# Patient Record
Sex: FEMALE | Race: WHITE | Employment: OTHER
[De-identification: names, ages, dates, MRNs, and addresses within clinical notes are randomized per-mention and may not be internally consistent; named-entity substitution may affect disease eponyms.]

---

## 2023-07-21 ENCOUNTER — TRANSCRIBE ORDERS (OUTPATIENT)
Dept: OTHER | Age: 63
End: 2023-07-21

## 2023-07-21 DIAGNOSIS — G35 MULTIPLE SCLEROSIS (H): Primary | ICD-10-CM

## 2023-09-30 ENCOUNTER — HEALTH MAINTENANCE LETTER (OUTPATIENT)
Age: 63
End: 2023-09-30

## 2023-11-12 NOTE — PROGRESS NOTES
[facility] Neurology   MS Clinic Evaluation     Chief Complaint: re-establish care for known MS      11/13/2023   Source of information: Patient and chart review    History of Present Symptom:  Nelida Perry is a 63 year old female with a PMH significant for MS since 2006 (she used to see Dr. Frankel but then was seeing Dr. Wells when Dr. Frankel left) stable on copaxone, as well as a left foot droop and previous L4/5 and L5/1 diskectomy in 2014 and evidence of L5 radiculopathy, who presents today to re-establish care with Dr. Frankel for MS.      Per chart review, she has had MS since 2006, started medications in 2007. She tells me today that she initially saw Dr. Gasca and then another provider at [facility] of neurology and then connected with Dr. Frankel, whom she is very grateful to have met. Nelida has previously been on different formulations, one called Glitopa but is currently on Copaxone and prefers not to be on the generic. She is currently on Copaxone 40 mg injections 3x weekly, and has been doing this regiment for about 5 years ago, prior  she was doing daily injections. Nelida denies any new concerns or complaints today. She is interested to discuss how her MS may react to need for right hip replacement and is very nervous about surgery upcoming.       Disease onset:  2006, at age 44, with sx of optic neuritis symptoms (unsure what side, from notes appears R), as well as Left leg numbness/burning sensations but complicated by lumbar radiculopathy.     Most recent relapse: None. 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  Copaxone 20 mg 0550-5482, lipoatrohy  Copaxone 40 mg 2019-present      Symptom management: No particular concerns. Left foot drop related to lumbar and peroneal concerns since 2014. She does have some other  "medical conditions going on. She has to have right hip replacement coming up and is concerned that this surgery could cause worsening MS or cause a flare.     Fatigue: Denies  Mood: Feels \"fine\" but the pain is 'weary\" and she tries to be cautious with use of medications. She feels the pain does effect her ability to do things that would improve her mood  Sleep: 4-5 hours a night, she is \"awake a lot\" not particularly due to pain   Bowel/bladder changes: Denies frequency, urgency or urine. Daily bowel movements. No incontinence   Gait/balance: Her right hip causes pain her to limp as well as her left foot drop and her back causes her pain sometimes. Her back will flare with more activity.     She is now retired as of last  2 years, she was a  for the [employer].  She has 2 children, one locally. She has a sister she is very close with.       Interestingly she does tell me that 2 x when she injected the copaxone near her bellybutton she had a 20 minute reaction of flushing and tachycardia with shaking, but this has never happened again if she avoids that area.     The patient's medical, surgical, social, and family history were personally reviewed with the patient.  No past medical history on file.   No past surgical history on file.  Social History     Tobacco Use    Smoking status: Never    Smokeless tobacco: Never     No family history on file.  Current Outpatient Medications   Medication    atorvastatin (LIPITOR) 20 MG tablet    cetirizine (ZYRTEC) 10 MG tablet    cholecalciferol (VITAMIN D3) 125 mcg (5000 units) capsule    COPAXONE 40 MG/ML injection    hydrochlorothiazide (HYDRODIURIL) 25 MG tablet    levothyroxine (SYNTHROID/LEVOTHROID) 112 MCG tablet    Multiple Vitamins-Minerals (CENTRUM-LUTEIN OR)    misoprostol (CYTOTEC) 200 MCG tablet     No current facility-administered medications for this visit.     No Known Allergies    Sister with sjrogrens, no fhx of MS. Brother with diabetes " (T2DM)    Physical Examination   Vitals: BP (!) 141/84 (BP Location: Right arm, Patient Position: Sitting)   Pulse 73    General: Patient appears comfortable in no acute distress. Pleasant and talkative, energetic and engaged  HEENT: NC/AT, no icterus, moist mucous membranes  Chest: non-labored on RA  Extremities: Warm, no edema  Skin: No rash or lesion   Psych: Affect appropriate for situation   Neuro:Mental status: Awake, alert, attentive. Language is fluent with intact comprehension.   Cranial nerves: pupils equal, conjugate gaze, EOMI, face symmetric, shoulder shrug strong, tongue protrusion midline, no dysarthria.   Motor: Normal muscle bulk and tone. No abnormal movements.   She is wearing an ankle brace on left leg and b/l danyel hose       R L  Deltoid  5 5  Biceps  5 5  Triceps  5 5  Wrist Flex  5 5  Wrist Ext 5 5  Finger Flex 5 5  Finger Ext 5 5    Hip flexion 4 (limited by pain)  5-  Knee flexion 5 5  Knee ext 5 5  Dorsiflexion 5 4-  PlantarFlexion 5         5  Inversion         5          3  Eversion          5         4-    Reflexes: brisk reflexes symmetric in biceps, brachioradialis, +3 reflexes at patellae  Sensory: Intact to light touch but reportedly reduced on lateral aspect of left leg Romberg is negative.   Coordination: FNF without ataxia or dysmetria.    Gait: Normal width, stride length minimally reduced, limps with pain in right hip and left foot drags subtley  Able to rise from chair with arms crossed. Did not test ability to to rise from chair with each leg individually,  Able to balance on each leg for > 1 seconds individually, harder on the left leg.     Laboratory:  MRI Brain - personally reviewed with Dr. Frankel  7/2022  1.  No change.   2.  Mild burden of supratentorial white matter lesions correlating with the history of multiple sclerosis.   3.  Mild burden of T1 hypointense lesions.   4.  No enhancing lesion.     MRI Cervical spine   7/2022  1.  No change.   2.  Mild burden of white  matter lesions in the cord at C3, C5 and C7-T1.   3.  No enhancing lesion.   4.  Severe right C5-C6 neural foraminal stenosis due to chronic degenerative change.     MRI Thoracic spine   7/2022  1.  No change.   2.  Subtle lesions in the cord at C7-T1 and T8.   3.  No enhancing lesion.   4.  No high-grade spinal canal or neural foraminal stenosis.   5.  Incidental left paraspinal cyst.     MRI lumbar spine 12/2022 -   IMPRESSION:   1. At L4-L5, a broad-based central disc protrusion contributes to moderate right lateral recess stenosis and impinges upon the descending right L5 nerve root. There is mild-to-moderate right neural foraminal stenosis.   2.  At L5-S1, a broad-based disc herniation contributes to severe right lateral recess stenosis with impingement upon the descending right S1 nerve root. This also abuts the descending left S1 nerve root. There is moderate left neural foraminal stenosis.   3.  Additional degenerative changes as above without high-grade spinal canal stenosis or other sites of high-grade neural foraminal stenosis.       MRI brain   Stable from 2006, 2009, [id number]  2016 - personally reviewed with Dr. Frankel-low lesion burden, periventricular, no posterior fossa lesions, no contrast enhancing lesions  4/2019 - no new lesions, no contrast enhancing lesions  7/2022- no new lesions, personally reviewed with Dr. Frankel    MRI cervical and thoracic spine  2016 - questionable lesions at c3, t1, t6, and t10. Report indicates stable but image quality is difficult to tell.    MRI C SPINE only:   4/2019 - concern for new lesion L lat C5, contrast negative  7/2022 - no new lesions    MRI T spine:   7/2022 - no new lesions        Assessment/Plan:  Nelida Perry is a 63 year old female with MS since 2006 (she used to see Dr. Frankel but then was seeing Dr. Wells when Dr. Frankel left) stable on Copaxone, but additionally has persistent left foot droop likely 2/2 L5 radiculopathy with contributions from MS  possible, who presents today to re-establish care with Dr. Frankel for MS.  Nelida has had no flares or relapses since she was last evaluated. She has had no significant difficulty with her medications for MS. She has had stable imaging, last completed over 1 year ago. Today we did discuss the possibility of her coming off her MS medications  considering her age and stability, (since she is now stable for over 10 years, and is almost greater than 64 in age),  but recommended waiting until completion of right hip surgery and we can re discuss at next visit. If we plan to disconintue the medication we will need repeat baseline full MRI, and then likely get MRI imaging monitoring at 6 months, and then 1 year from that time point to monitor, if discontinuation is something she is interested in. Additionally, there is no rush to discontinue the medication, especially as Copaxone does not impair immune function. We discussed that surgery can worsen her symptoms but unlikely that surgery would cause any new lesions from MS. We also discussed the importance of PT for recovery and that her recovery may be prolonged compared to someone who does not have MS, and that she may need a specialized neurorehab after surgery.     -Follow up 2-3 months after right hip surgery  -Highly encouraged PT after surgery and continued daily movement and exercise  -Refilled copaxone 40 mg injection to be taken 3x weekly          Patient seen and discussed with Dr. Frankel.  Michelle Holt DO, CALOS  Neurology Resident, PGY-4

## 2023-11-13 ENCOUNTER — OFFICE VISIT (OUTPATIENT)
Dept: NEUROLOGY | Facility: CLINIC | Age: 63
End: 2023-11-13
Payer: COMMERCIAL

## 2023-11-13 VITALS — SYSTOLIC BLOOD PRESSURE: 141 MMHG | DIASTOLIC BLOOD PRESSURE: 84 MMHG | HEART RATE: 73 BPM

## 2023-11-13 DIAGNOSIS — G35 MULTIPLE SCLEROSIS (H): Primary | ICD-10-CM

## 2023-11-13 DIAGNOSIS — R26.81 GAIT INSTABILITY: ICD-10-CM

## 2023-11-13 DIAGNOSIS — M16.11 PRIMARY OSTEOARTHRITIS OF RIGHT HIP: ICD-10-CM

## 2023-11-13 DIAGNOSIS — M21.372 LEFT FOOT DROP: ICD-10-CM

## 2023-11-13 PROCEDURE — 99205 OFFICE O/P NEW HI 60 MIN: CPT | Mod: GC | Performed by: PSYCHIATRY & NEUROLOGY

## 2023-11-13 RX ORDER — LEVOTHYROXINE SODIUM 112 UG/1
1 TABLET ORAL DAILY
COMMUNITY
Start: 2023-10-24 | End: 2024-10-23

## 2023-11-13 RX ORDER — GLATIRAMER ACETATE 40 MG/ML
INJECTION, SOLUTION SUBCUTANEOUS
COMMUNITY
Start: 2023-07-05 | End: 2023-11-13

## 2023-11-13 RX ORDER — HYDROCHLOROTHIAZIDE 25 MG/1
25 TABLET ORAL PRN
COMMUNITY
Start: 2023-07-20 | End: 2024-07-19

## 2023-11-13 RX ORDER — MISOPROSTOL 200 UG/1
TABLET ORAL
COMMUNITY
Start: 2023-08-28

## 2023-11-13 RX ORDER — GLATIRAMER ACETATE 40 MG/ML
40 INJECTION, SOLUTION SUBCUTANEOUS
Qty: 12 ML | Refills: 11 | Status: SHIPPED | OUTPATIENT
Start: 2023-11-13

## 2023-11-13 RX ORDER — CETIRIZINE HYDROCHLORIDE 10 MG/1
10 TABLET ORAL DAILY
COMMUNITY

## 2023-11-13 RX ORDER — ATORVASTATIN CALCIUM 20 MG/1
20 TABLET, FILM COATED ORAL DAILY
COMMUNITY
Start: 2023-10-24 | End: 2024-10-23

## 2023-11-13 NOTE — NURSING NOTE
Chief Complaint   Patient presents with    MS     Referred by NOHEMY Delvalle on 11/13/2023 at 8:50 AM

## 2023-11-13 NOTE — LETTER
11/13/2023         RE: Nelida Perry  [address]  [place] [address]        Dear Colleague,    Thank you for referring your patient, Nelida Perry, to the [facility] NEUROLOGY [facility]IC [facility]. Please see a copy of my visit note below.      [facility] Neurology   MS Clinic Evaluation     Chief Complaint: re-establish care for known MS      11/13/2023   Source of information: Patient and chart review    History of Present Symptom:  Nelida Perry is a 63 year old female with a PMH significant for MS since 2006 (she used to see Dr. Frankel but then was seeing Dr. Wells when Dr. Frankel left) stable on copaxone, as well as a left foot droop and previous L4/5 and L5/1 diskectomy in 2014 and evidence of L5 radiculopathy, who presents today to re-establish care with Dr. Frankel for MS.      Per chart review, she has had MS since 2006, started medications in 2007. She tells me today that she initially saw Dr. Gasca and then another provider at [facility] of neurology and then connected with Dr. Frankel, whom she is very grateful to have met. Nelida has previously been on different formulations, one called Glitopa but is currently on Copaxone and prefers not to be on the generic. She is currently on Copaxone 40 mg injections 3x weekly, and has been doing this regiment for about 5 years ago, prior  she was doing daily injections. Nelida denies any new concerns or complaints today. She is interested to discuss how her MS may react to need for right hip replacement and is very nervous about surgery upcoming.       Disease onset:  2006, at age 44, with sx of optic neuritis symptoms (unsure what side, from notes appears R), as well as Left leg numbness/burning sensations but complicated by lumbar radiculopathy.     Most recent relapse: None. 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  Copaxone 20 mg [id number], lipoatrohy  Copaxone 40 mg 2019-present      Symptom management: No particular concerns. Left foot drop related to lumbar and peroneal concerns since 2014. She does have some other medical conditions going on. She has to have right hip replacement coming up and is concerned that this surgery could cause worsening MS or cause a flare.     Fatigue: Denies  Mood: Feels \"fine\" but the pain is 'weary\" and she tries to be cautious with use of medications. She feels the pain does effect her ability to do things that would improve her mood  Sleep: 4-5 hours a night, she is \"awake a lot\" not particularly due to pain   Bowel/bladder changes: Denies frequency, urgency or urine. Daily bowel movements. No incontinence   Gait/balance: Her right hip causes pain her to limp as well as her left foot drop and her back causes her pain sometimes. Her back will flare with more activity.     She is now retired as of last  2 years, she was a  for the [employer].  She has 2 children, one locally. She has a sister she is very close with.       Interestingly she does tell me that 2 x when she injected the copaxone near her bellybutton she had a 20 minute reaction of flushing and tachycardia with shaking, but this has never happened again if she avoids that area.     The patient's medical, surgical, social, and family history were personally reviewed with the patient.  No past medical history on file.   No past surgical history on file.  Social History     Tobacco Use     Smoking status: Never     Smokeless tobacco: Never     No family history on file.  Current Outpatient Medications   Medication     atorvastatin (LIPITOR) 20 MG tablet     cetirizine (ZYRTEC) 10 MG tablet     cholecalciferol (VITAMIN D3) 125 mcg (5000 units) capsule     COPAXONE 40 MG/ML injection     hydrochlorothiazide (HYDRODIURIL) 25 MG tablet     levothyroxine " (SYNTHROID/LEVOTHROID) 112 MCG tablet     Multiple Vitamins-Minerals (CENTRUM-LUTEIN OR)     misoprostol (CYTOTEC) 200 MCG tablet     No current facility-administered medications for this visit.     No Known Allergies    Sister with sjrogrens, no fhx of MS. Brother with diabetes (T2DM)    Physical Examination   Vitals: BP (!) 141/84 (BP Location: Right arm, Patient Position: Sitting)   Pulse 73    General: Patient appears comfortable in no acute distress. Pleasant and talkative, energetic and engaged  HEENT: NC/AT, no icterus, moist mucous membranes  Chest: non-labored on RA  Extremities: Warm, no edema  Skin: No rash or lesion   Psych: Affect appropriate for situation   Neuro:Mental status: Awake, alert, attentive. Language is fluent with intact comprehension.   Cranial nerves: pupils equal, conjugate gaze, EOMI, face symmetric, shoulder shrug strong, tongue protrusion midline, no dysarthria.   Motor: Normal muscle bulk and tone. No abnormal movements.   She is wearing an ankle brace on left leg and b/l danyel hose       R L  Deltoid  5 5  Biceps  5 5  Triceps  5 5  Wrist Flex  5 5  Wrist Ext 5 5  Finger Flex 5 5  Finger Ext 5 5    Hip flexion 4 (limited by pain)  5-  Knee flexion 5 5  Knee ext 5 5  Dorsiflexion 5 4-  PlantarFlexion 5         5  Inversion         5          3  Eversion          5         4-    Reflexes: brisk reflexes symmetric in biceps, brachioradialis, +3 reflexes at patellae  Sensory: Intact to light touch but reportedly reduced on lateral aspect of left leg Romberg is negative.   Coordination: FNF without ataxia or dysmetria.    Gait: Normal width, stride length minimally reduced, limps with pain in right hip and left foot drags subtley  Able to rise from chair with arms crossed. Did not test ability to to rise from chair with each leg individually,  Able to balance on each leg for > 1 seconds individually, harder on the left leg.     Laboratory:  MRI Brain - personally reviewed with   Jostin  7/2022  1.  No change.   2.  Mild burden of supratentorial white matter lesions correlating with the history of multiple sclerosis.   3.  Mild burden of T1 hypointense lesions.   4.  No enhancing lesion.     MRI Cervical spine   7/2022  1.  No change.   2.  Mild burden of white matter lesions in the cord at C3, C5 and C7-T1.   3.  No enhancing lesion.   4.  Severe right C5-C6 neural foraminal stenosis due to chronic degenerative change.     MRI Thoracic spine   7/2022  1.  No change.   2.  Subtle lesions in the cord at C7-T1 and T8.   3.  No enhancing lesion.   4.  No high-grade spinal canal or neural foraminal stenosis.   5.  Incidental left paraspinal cyst.     MRI lumbar spine 12/2022 -   IMPRESSION:   1. At L4-L5, a broad-based central disc protrusion contributes to moderate right lateral recess stenosis and impinges upon the descending right L5 nerve root. There is mild-to-moderate right neural foraminal stenosis.   2.  At L5-S1, a broad-based disc herniation contributes to severe right lateral recess stenosis with impingement upon the descending right S1 nerve root. This also abuts the descending left S1 nerve root. There is moderate left neural foraminal stenosis.   3.  Additional degenerative changes as above without high-grade spinal canal stenosis or other sites of high-grade neural foraminal stenosis.       MRI brain   Stable from 2006, 2009, [id number]  2016 - personally reviewed with Dr. Frankel-low lesion burden, periventricular, no posterior fossa lesions, no contrast enhancing lesions  4/2019 - no new lesions, no contrast enhancing lesions  7/2022- no new lesions, personally reviewed with Dr. Frankel    MRI cervical and thoracic spine  2016 - questionable lesions at c3, t1, t6, and t10. Report indicates stable but image quality is difficult to tell.    MRI C SPINE only:   4/2019 - concern for new lesion L lat C5, contrast negative  7/2022 - no new lesions    MRI T spine:   7/2022 - no new  lesions        Assessment/Plan:  Nelida Perry is a 63 year old female with MS since 2006 (she used to see Dr. Frankel but then was seeing Dr. Wells when Dr. Frankel left) stable on Copaxone, but additionally has persistent left foot droop likely 2/2 L5 radiculopathy with contributions from MS possible, who presents today to re-establish care with Dr. Frankel for MS.  Nelida has had no flares or relapses since she was last evaluated. She has had no significant difficulty with her medications for MS. She has had stable imaging, last completed over 1 year ago. Today we did discuss the possibility of her coming off her MS medications  considering her age and stability, (since she is now stable for over 10 years, and is almost greater than 64 in age),  but recommended waiting until completion of right hip surgery and we can re discuss at next visit. If we plan to disconintue the medication we will need repeat baseline full MRI, and then likely get MRI imaging monitoring at 6 months, and then 1 year from that time point to monitor, if discontinuation is something she is interested in. Additionally, there is no rush to discontinue the medication, especially as Copaxone does not impair immune function. We discussed that surgery can worsen her symptoms but unlikely that surgery would cause any new lesions from MS. We also discussed the importance of PT for recovery and that her recovery may be prolonged compared to someone who does not have MS, and that she may need a specialized neurorehab after surgery.     -Follow up 2-3 months after right hip surgery  -Highly encouraged PT after surgery and continued daily movement and exercise  -Refilled copaxone 40 mg injection to be taken 3x weekly          Patient seen and discussed with Dr. Frankel.  Michelle Holt DO, CALOS  Neurology Resident, PGY-4                   Attestation signed by Tracy Frankel MD at 11/14/2023  9:06 AM:  I personally saw and evaluated Mrs. Perry  with Dr. Holt on the date of service.  I have reviewed the above documentation and agree with the findings and recommendations.     Gait currently limited by hip pain   Counseled patient on benefit of proceeding with hip replacement - pain resolution will allow her to get the exercise needed to maintain gait with ms   Reaching point where she could likely come off treatment, though would advise continuation until 6-12 months post operative at minimum   Risks of continuing treatment are low, so could consider continuing     I would like to see her after surgery, or next summer    A total of 60 minutes were personally spent in the care of this patient on the date of service.     Tracy Frankel MD on 11/14/2023 at 9:03 AM      Disease onset: age 44, L leg numbness/burning, though ?lumbar radiculopathy   Age 46, R ON     DMD hx:   Copaxone 20 mg [id number], lipoatrohy  Copaxone 40 mg 2019-present    D-50    MRI brain   2016 - personally reviewed, overall low lesion burden, periventricular, no posterior fossa lesions, gd-   Stable from 2006, 2009, [id number] 4/2019 - no new lesions, gd-  7/2022- no new lesions    MRI cervical and thoracic spine  2016 - personally reviewed, possible lesions at c3, t1, t6, t10, reported stable but image quality is poor     MRI cervical spine   4/2019 - possible new lesion L lat C5, gd-, marked difference in imaging quality  7/2022 - no new lesions    MRI thoracic spine   4/2019 - no new lesions, gd-   7/2022 - no new lesions    MRI lumbar spine 12/2022 -   IMPRESSION:   1. At L4-L5, a broad-based central disc protrusion contributes to moderate right lateral recess stenosis and impinges upon the descending right L5 nerve root. There is mild-to-moderate right neural foraminal stenosis.   2.  At L5-S1, a broad-based disc herniation contributes to severe right lateral recess stenosis with impingement upon the descending right S1 nerve root. This also abuts the descending left S1  nerve root. There is moderate left neural foraminal stenosis.   3.  Additional degenerative changes as above without high-grade spinal canal stenosis or other sites of high-grade neural foraminal stenosis.       Again, thank you for allowing me to participate in the care of your patient.        Sincerely,        Tracy Frankel MD

## 2023-11-13 NOTE — PATIENT INSTRUCTIONS
Continue copaxone    Call orthopedics to see if they will require you to do physical therapy - this will allow you to get started now     Follow up with me next summer -- ideal is for me to see you after hip replacement surgery

## 2023-11-13 NOTE — PROGRESS NOTES
Disease onset: age 44, L leg numbness/burning, though ?lumbar radiculopathy   Age 46, R ON     DMD hx:   Copaxone 20 mg 9572-7031, lipoatrohy  Copaxone 40 mg 2019-present    D-50    MRI brain   2016 - personally reviewed, overall low lesion burden, periventricular, no posterior fossa lesions, gd-   Stable from 2006, 2009, [id number] 4/2019 - no new lesions, gd-  7/2022- no new lesions    MRI cervical and thoracic spine  2016 - personally reviewed, possible lesions at c3, t1, t6, t10, reported stable but image quality is poor     MRI cervical spine   4/2019 - possible new lesion L lat C5, gd-, marked difference in imaging quality  7/2022 - no new lesions    MRI thoracic spine   4/2019 - no new lesions, gd-   7/2022 - no new lesions    MRI lumbar spine 12/2022 -   IMPRESSION:   1. At L4-L5, a broad-based central disc protrusion contributes to moderate right lateral recess stenosis and impinges upon the descending right L5 nerve root. There is mild-to-moderate right neural foraminal stenosis.   2.  At L5-S1, a broad-based disc herniation contributes to severe right lateral recess stenosis with impingement upon the descending right S1 nerve root. This also abuts the descending left S1 nerve root. There is moderate left neural foraminal stenosis.   3.  Additional degenerative changes as above without high-grade spinal canal stenosis or other sites of high-grade neural foraminal stenosis.

## 2024-04-27 ENCOUNTER — HEALTH MAINTENANCE LETTER (OUTPATIENT)
Age: 64
End: 2024-04-27

## 2024-06-17 ENCOUNTER — TELEPHONE (OUTPATIENT)
Dept: NEUROLOGY | Facility: CLINIC | Age: 64
End: 2024-06-17
Payer: COMMERCIAL

## 2024-06-17 NOTE — TELEPHONE ENCOUNTER
PA Initiation    Medication: COPAXONE 40 MG/ML SC SOSY  Insurance Company: [employer] - Phone [phone] Fax [phone]  Pharm[place] [facility] [place] [address]  Filling Pharmacy Phone:    Filling Pharmacy Fax:    Start Date: 6/17/2024    [id number]

## 2024-06-18 NOTE — TELEPHONE ENCOUNTER
Prior Authorization Approval    Medication: COPAXONE 40 MG/ML SC SOSY  Authorization Effective Date: 6/17/2024  Authorization Expiration Date: 6/17/2025  Approved Dose/Quantity: 12ml per 28 days  Reference #: [id number]   Insurance Company: [facility] [employer] - Phone [phone] Fax [phone]  Expected CoPay: $    CoPay Card Available:      Financial Assistance Needed: Unknown  [facility] filling the [facility]  Pharmacy Notified: Not needed  Patient Notified: Not needed

## 2024-10-23 DIAGNOSIS — G35 MULTIPLE SCLEROSIS (H): ICD-10-CM

## 2024-10-24 RX ORDER — GLATIRAMER ACETATE 40 MG/ML
40 INJECTION, SOLUTION SUBCUTANEOUS
Qty: 12 ML | Refills: 11 | Status: SHIPPED | OUTPATIENT
Start: 2024-10-25

## 2024-10-31 ENCOUNTER — OFFICE VISIT (OUTPATIENT)
Dept: NEUROLOGY | Facility: CLINIC | Age: 64
End: 2024-10-31
Payer: COMMERCIAL

## 2024-10-31 VITALS — SYSTOLIC BLOOD PRESSURE: 165 MMHG | HEART RATE: 67 BPM | DIASTOLIC BLOOD PRESSURE: 74 MMHG

## 2024-10-31 DIAGNOSIS — M21.372 LEFT FOOT DROP: ICD-10-CM

## 2024-10-31 DIAGNOSIS — G35 MULTIPLE SCLEROSIS (H): Primary | ICD-10-CM

## 2024-10-31 PROCEDURE — 99214 OFFICE O/P EST MOD 30 MIN: CPT | Performed by: PSYCHIATRY & NEUROLOGY

## 2024-10-31 NOTE — NURSING NOTE
Chief Complaint   Patient presents with    Follow Up     NOHEMY Corona on 10/31/2024 at 9:18 AM

## 2024-10-31 NOTE — PATIENT INSTRUCTIONS
Your exam is stable     Continue copaxone   Though consider discontinuation if mri is stable    Mri in 1 year     Follow up after 1 year

## 2024-10-31 NOTE — PROGRESS NOTES
Date of Service: 10/31/2024    [facility] Neurology   MS Clinic Evaluation    Subjective: 64-year-old woman who presents in follow-up for multiple sclerosis.    She does not report any discrete new symptoms related to multiple sclerosis.  She is able to go on walks up to 3 miles without difficulty.  She does not notice any motor fatigue during her walks.  She has not had any falls in the past year.    She did undergo a right total hip arthroplasty in February.  Over the course of 1 month she was able to improve her walking to the point that she can walk 1 mile.    She continues to do Copaxone.  She is tolerating this well.  No more episodes of heart racing.    Disease onset: age 44, L leg numbness/burning, though ?lumbar radiculopathy   Age 46, R ON      DMD hx:   Copaxone 20 mg [id number], lipoatrohy  Copaxone 40 mg 2019-present       No Known Allergies    Current Outpatient Medications   Medication Sig Dispense Refill    cetirizine (ZYRTEC) 10 MG tablet Take 10 mg by mouth daily      cholecalciferol (VITAMIN D3) 125 mcg (5000 units) capsule Take 1 capsule by mouth daily      COPAXONE 40 MG/ML injection Inject 40 mg subcutaneously three times a week. INJECT ONE SYRINGE SUBCUTANEOUSLY THREE TIMES PER WEEK AT LEAST 48 HOURS APART. ALLOW SYRINGE TO WARM TO ROOM TEMPERATURE FOR 20 MINUTES. REFRIGERATE. 12 mL 11    Multiple Vitamins-Minerals (CENTRUM-LUTEIN OR) Take 1 tablet by mouth daily      atorvastatin (LIPITOR) 20 MG tablet Take 20 mg by mouth daily      hydrochlorothiazide (HYDRODIURIL) 25 MG tablet Take 25 mg by mouth as needed      levothyroxine (SYNTHROID/LEVOTHROID) 112 MCG tablet Take 1 tablet by mouth daily      misoprostol (CYTOTEC) 200 MCG tablet Place 3 tablets in vagina the evening prior to surgery- Not taking till end of November 30th, 2023 (Patient not taking: Reported on 11/13/2023)       No current facility-administered medications for this visit.        Past medical, surgical, social and family  history was personally reviewed. Pertinent details noted above.     Physical Examination:   BP (!) 165/74 (BP Location: Right arm, Patient Position: Sitting, Cuff Size: Adult Regular)   Pulse 67     General: no acute distress  Cranial nerves:   VFFC  PERRL w/no RAPD  EOM full w/no TRELL   Face symmetric  Hearing intact  No dysarthria   Motor:   Tone is normal   Bulk is normal     R L  Deltoid  5 5  Biceps  5 5  Triceps 5 5  Wrist ext 5 5  Finger ext 5 5  Finger abd 5 5    Hip flexion 5 5  Knee flexion 5 5  Knee ext 5 5  Ankle d/f 5 4    Reflexes: 2+ and symmetric throughout, no ankle clonus  Sensory: vibration is mildly reduced in the ankles  Romberg is absent  Coordination: no ataxia or dysmetria  Gait: normal base and stride, tandem gait is intact, able to balance on one foot and hop x 5 bilaterally    Tests/Imaging:   D-50     MRI brain   2016 - personally reviewed, overall low lesion burden, periventricular, no posterior fossa lesions, gd-   Stable from 2006, 2009, [id number] 4/2019 - no new lesions, gd-  7/2022- no new lesions    MRI cervical and thoracic spine  2016 - personally reviewed, possible lesions at c3, t1, t6, t10, reported stable but image quality is poor     MRI cervical spine   4/2019 - possible new lesion L lat C5, gd-, marked difference in imaging quality  7/2022 - no new lesions    MRI thoracic spine   4/2019 - no new lesions, gd-   7/2022 - no new lesions     MRI lumbar spine 12/2022 -   IMPRESSION:   1. At L4-L5, a broad-based central disc protrusion contributes to moderate right lateral recess stenosis and impinges upon the descending right L5 nerve root. There is mild-to-moderate right neural foraminal stenosis.   2.  At L5-S1, a broad-based disc herniation contributes to severe right lateral recess stenosis with impingement upon the descending right S1 nerve root. This also abuts the descending left S1 nerve root. There is moderate left neural foraminal stenosis.   3.  Additional  degenerative changes as above without high-grade spinal canal stenosis or other sites of high-grade neural foraminal stenosis.     Assessment: 64-year-old woman with relapsing remitting multiple sclerosis who appears to be clinically stable on Copaxone.  She is due for radiologic surveillance.  I have advised she undergo this in 1 year.  If this is stable we could consider discontinuation of treatment.  She was a bit hesitant to consider this, though we will discuss at her next appointment.    Plan:   -Continue Copaxone  - MRI in 1 year  - Follow-up in 1 year    Note was completed with the assistance of Dragon Fluency software which can often result in accidental word substitutions.     A total of 30 minutes on the date of service were spent in the care of this patient.   Tracy Frankel MD on 10/31/2024 at 9:24 AM

## 2024-10-31 NOTE — LETTER
10/31/2024      Nelida Perry  [address]  [place] [address]      Dear Colleague,    Thank you for referring your patient, Nelida Perry, to the [facility] NEUROLOGY CLINIC [facility]. Please see a copy of my visit note below.    Date of Service: 10/31/2024    [facility] Neurology   MS Clinic Evaluation    Subjective: 64-year-old woman who presents in follow-up for multiple sclerosis.    She does not report any discrete new symptoms related to multiple sclerosis.  She is able to go on walks up to 3 miles without difficulty.  She does not notice any motor fatigue during her walks.  She has not had any falls in the past year.    She did undergo a right total hip arthroplasty in February.  Over the course of 1 month she was able to improve her walking to the point that she can walk 1 mile.    She continues to do Copaxone.  She is tolerating this well.  No more episodes of heart racing.    Disease onset: age 44, L leg numbness/burning, though ?lumbar radiculopathy   Age 46, R ON      DMD hx:   Copaxone 20 mg [id number], lipoatrohy  Copaxone 40 mg 2019-present       No Known Allergies    Current Outpatient Medications   Medication Sig Dispense Refill     cetirizine (ZYRTEC) 10 MG tablet Take 10 mg by mouth daily       cholecalciferol (VITAMIN D3) 125 mcg (5000 units) capsule Take 1 capsule by mouth daily       COPAXONE 40 MG/ML injection Inject 40 mg subcutaneously three times a week. INJECT ONE SYRINGE SUBCUTANEOUSLY THREE TIMES PER WEEK AT LEAST 48 HOURS APART. ALLOW SYRINGE TO WARM TO ROOM TEMPERATURE FOR 20 MINUTES. REFRIGERATE. 12 mL 11     Multiple Vitamins-Minerals (CENTRUM-LUTEIN OR) Take 1 tablet by mouth daily       atorvastatin (LIPITOR) 20 MG tablet Take 20 mg by mouth daily       hydrochlorothiazide (HYDRODIURIL) 25 MG tablet Take 25 mg by mouth as needed       levothyroxine (SYNTHROID/LEVOTHROID) 112 MCG tablet Take 1 tablet by mouth daily       misoprostol (CYTOTEC) 200 MCG tablet Place 3  tablets in vagina the evening prior to surgery- Not taking till end of November 30th, 2023 (Patient not taking: Reported on 11/13/2023)       No current facility-administered medications for this visit.        Past medical, surgical, social and family history was personally reviewed. Pertinent details noted above.     Physical Examination:   BP (!) 165/74 (BP Location: Right arm, Patient Position: Sitting, Cuff Size: Adult Regular)   Pulse 67     General: no acute distress  Cranial nerves:   VFFC  PERRL w/no RAPD  EOM full w/no TRELL   Face symmetric  Hearing intact  No dysarthria   Motor:   Tone is normal   Bulk is normal     R L  Deltoid  5 5  Biceps  5 5  Triceps 5 5  Wrist ext 5 5  Finger ext 5 5  Finger abd 5 5    Hip flexion 5 5  Knee flexion 5 5  Knee ext 5 5  Ankle d/f 5 4    Reflexes: 2+ and symmetric throughout, no ankle clonus  Sensory: vibration is mildly reduced in the ankles  Romberg is absent  Coordination: no ataxia or dysmetria  Gait: normal base and stride, tandem gait is intact, able to balance on one foot and hop x 5 bilaterally    Tests/Imaging:   D-50     MRI brain   2016 - personally reviewed, overall low lesion burden, periventricular, no posterior fossa lesions, gd-   Stable from 2006, 2009, [id number] 4/2019 - no new lesions, gd-  7/2022- no new lesions    MRI cervical and thoracic spine  2016 - personally reviewed, possible lesions at c3, t1, t6, t10, reported stable but image quality is poor     MRI cervical spine   4/2019 - possible new lesion L lat C5, gd-, marked difference in imaging quality  7/2022 - no new lesions    MRI thoracic spine   4/2019 - no new lesions, gd-   7/2022 - no new lesions     MRI lumbar spine 12/2022 -   IMPRESSION:   1. At L4-L5, a broad-based central disc protrusion contributes to moderate right lateral recess stenosis and impinges upon the descending right L5 nerve root. There is mild-to-moderate right neural foraminal stenosis.   2.  At L5-S1, a broad-based  disc herniation contributes to severe right lateral recess stenosis with impingement upon the descending right S1 nerve root. This also abuts the descending left S1 nerve root. There is moderate left neural foraminal stenosis.   3.  Additional degenerative changes as above without high-grade spinal canal stenosis or other sites of high-grade neural foraminal stenosis.     Assessment: 64-year-old woman with relapsing remitting multiple sclerosis who appears to be clinically stable on Copaxone.  She is due for radiologic surveillance.  I have advised she undergo this in 1 year.  If this is stable we could consider discontinuation of treatment.  She was a bit hesitant to consider this, though we will discuss at her next appointment.    Plan:   -Continue Copaxone  - MRI in 1 year  - Follow-up in 1 year    Note was completed with the assistance of Dragon Fluency software which can often result in accidental word substitutions.     A total of 30 minutes on the date of service were spent in the care of this patient.   Tracy Frankel MD on 10/31/2024 at 9:24 AM        Again, thank you for allowing me to participate in the care of your patient.        Sincerely,        Tracy Frankel MD

## 2025-01-22 NOTE — TELEPHONE ENCOUNTER
Dr Cerda otified pt 1 mg BID stored in pharmacy but pt reports has been taken TId for two weeks to amonths time.   Pending Prescriptions:                       Disp   Refills    COPAXONE 40 MG/ML injection               12 mL  11           Sig: Inject 40 mg subcutaneously three times a week.           INJECT ONE SYRINGE SUBCUTANEOUSLY THREE TIMES PER           WEEK AT LEAST 48 HOURS APART. ALLOW SYRINGE TO           WARM TO ROOM TEMPERATURE FOR 20 MINUTES.           REFRIGERATE.    Future Appointments    Encounter Information   Provider Department Center   10/31/2024 9:30 AM (Arrive by 9:15 AM) Tracy Frankel [facility]       NOHEMY Calle on 10/23/2024 at 8:16 AM

## 2025-05-11 ENCOUNTER — HEALTH MAINTENANCE LETTER (OUTPATIENT)
Age: 65
End: 2025-05-11

## 2025-05-20 ENCOUNTER — TELEPHONE (OUTPATIENT)
Dept: NEUROLOGY | Facility: CLINIC | Age: 65
End: 2025-05-20
Payer: COMMERCIAL

## 2025-05-20 NOTE — TELEPHONE ENCOUNTER
Prior Authorization Approval    Medication: COPAXONE 40 MG/ML SC SOSY  Authorization Effective Date: 5/20/2025  Authorization Expiration Date: 5/20/2026  Approved Dose/Quantity: 28 days  Reference #:     Insurance Company: [facility] Prior Auth Dept, phone  [phone], Fax [phone]  Expected CoPay: $    CoPay Card Available:      Financial Assistance Needed:   [facility]  Pharmacy Notified: Yes  Patient Notified: Yes        Thank you,    Yana Malave [facility]  Specialty Pharmacy Clinic Liaison - CardiologyNeurologyMultiple Sclerosis  [facility]  [address] [place] [id number]  Ph: [phone] Fax: [phone]  [email or web address]

## 2025-05-20 NOTE — TELEPHONE ENCOUNTER
PA Initiation    Medication: COPAXONE 40 MG/ML SC SOSY  Insurance Company: [facility] Special[facility] Prior Auth Dept, phone  [phone], Fax [phone]  Pharm[facility] Filling the Rx[facility]  Filling Pharmacy Phone:    Filling Pharmacy Fax:    Start Date: 5/20/2025          Thank you,    Yana Malave [facility]  Specialty Pharmacy Clinic Liaison - CardiologyNeurologyMultiple Sclerosis  [facility] [address] [address]  Ph: [phone] Fax: [phone]  [email or web address]